# Patient Record
Sex: MALE | Race: OTHER | NOT HISPANIC OR LATINO
[De-identification: names, ages, dates, MRNs, and addresses within clinical notes are randomized per-mention and may not be internally consistent; named-entity substitution may affect disease eponyms.]

---

## 2018-02-07 PROBLEM — Z00.00 ENCOUNTER FOR PREVENTIVE HEALTH EXAMINATION: Status: ACTIVE | Noted: 2018-02-07

## 2018-03-26 ENCOUNTER — APPOINTMENT (OUTPATIENT)
Dept: CARDIOLOGY | Facility: CLINIC | Age: 68
End: 2018-03-26

## 2018-04-06 ENCOUNTER — APPOINTMENT (OUTPATIENT)
Dept: GASTROENTEROLOGY | Facility: CLINIC | Age: 68
End: 2018-04-06

## 2018-04-06 ENCOUNTER — OUTPATIENT (OUTPATIENT)
Dept: OUTPATIENT SERVICES | Facility: HOSPITAL | Age: 68
LOS: 1 days | Discharge: HOME | End: 2018-04-06

## 2018-04-06 VITALS
DIASTOLIC BLOOD PRESSURE: 77 MMHG | BODY MASS INDEX: 31.47 KG/M2 | WEIGHT: 200.5 LBS | HEIGHT: 67 IN | RESPIRATION RATE: 16 BRPM | SYSTOLIC BLOOD PRESSURE: 126 MMHG | HEART RATE: 62 BPM

## 2018-04-06 DIAGNOSIS — Z12.11 ENCOUNTER FOR SCREENING FOR MALIGNANT NEOPLASM OF COLON: ICD-10-CM

## 2018-04-06 DIAGNOSIS — Z95.1 PRESENCE OF AORTOCORONARY BYPASS GRAFT: ICD-10-CM

## 2018-04-06 DIAGNOSIS — Z78.9 OTHER SPECIFIED HEALTH STATUS: ICD-10-CM

## 2018-04-06 RX ORDER — CIPROFLOXACIN HYDROCHLORIDE 500 MG/1
500 TABLET, FILM COATED ORAL
Qty: 20 | Refills: 0 | Status: ACTIVE | COMMUNITY
Start: 2018-04-06 | End: 1900-01-01

## 2018-04-06 RX ORDER — METRONIDAZOLE 500 MG/1
500 TABLET ORAL 3 TIMES DAILY
Qty: 30 | Refills: 0 | Status: ACTIVE | COMMUNITY
Start: 2018-04-06 | End: 1900-01-01

## 2018-04-19 ENCOUNTER — APPOINTMENT (OUTPATIENT)
Dept: SURGERY | Facility: CLINIC | Age: 68
End: 2018-04-19
Payer: MEDICARE

## 2018-04-19 VITALS
BODY MASS INDEX: 31.55 KG/M2 | HEIGHT: 67 IN | WEIGHT: 201 LBS | DIASTOLIC BLOOD PRESSURE: 76 MMHG | SYSTOLIC BLOOD PRESSURE: 118 MMHG

## 2018-04-19 DIAGNOSIS — Z86.79 PERSONAL HISTORY OF OTHER DISEASES OF THE CIRCULATORY SYSTEM: ICD-10-CM

## 2018-04-19 DIAGNOSIS — K61.1 RECTAL ABSCESS: ICD-10-CM

## 2018-04-19 DIAGNOSIS — M25.471 EFFUSION, RIGHT ANKLE: ICD-10-CM

## 2018-04-19 DIAGNOSIS — M25.472 EFFUSION, RIGHT ANKLE: ICD-10-CM

## 2018-04-19 DIAGNOSIS — Z87.39 PERSONAL HISTORY OF OTHER DISEASES OF THE MUSCULOSKELETAL SYSTEM AND CONNECTIVE TISSUE: ICD-10-CM

## 2018-04-19 DIAGNOSIS — Z82.49 FAMILY HISTORY OF ISCHEMIC HEART DISEASE AND OTHER DISEASES OF THE CIRCULATORY SYSTEM: ICD-10-CM

## 2018-04-19 DIAGNOSIS — K60.3 ANAL FISTULA: ICD-10-CM

## 2018-04-19 PROCEDURE — 46600 DIAGNOSTIC ANOSCOPY SPX: CPT

## 2018-04-19 PROCEDURE — 99203 OFFICE O/P NEW LOW 30 MIN: CPT | Mod: 25

## 2018-04-19 RX ORDER — HYDROCORTISONE 2.5% 25 MG/G
2.5 CREAM TOPICAL 3 TIMES DAILY
Qty: 60 | Refills: 0 | Status: ACTIVE | COMMUNITY
Start: 2018-04-19 | End: 1900-01-01

## 2018-04-23 PROBLEM — Z82.49 FAMILY HISTORY OF CARDIAC DISORDER: Status: ACTIVE | Noted: 2018-04-19

## 2018-05-31 ENCOUNTER — MESSAGE (OUTPATIENT)
Age: 68
End: 2018-05-31

## 2018-06-05 ENCOUNTER — APPOINTMENT (OUTPATIENT)
Dept: SURGERY | Facility: CLINIC | Age: 68
End: 2018-06-05

## 2018-06-25 ENCOUNTER — APPOINTMENT (OUTPATIENT)
Dept: CARDIOLOGY | Facility: CLINIC | Age: 68
End: 2018-06-25

## 2018-06-25 ENCOUNTER — OUTPATIENT (OUTPATIENT)
Dept: OUTPATIENT SERVICES | Facility: HOSPITAL | Age: 68
LOS: 1 days | Discharge: HOME | End: 2018-06-25

## 2018-06-25 VITALS
HEIGHT: 67 IN | WEIGHT: 200 LBS | SYSTOLIC BLOOD PRESSURE: 100 MMHG | HEART RATE: 63 BPM | BODY MASS INDEX: 31.39 KG/M2 | DIASTOLIC BLOOD PRESSURE: 64 MMHG

## 2018-06-25 DIAGNOSIS — I10 ESSENTIAL (PRIMARY) HYPERTENSION: ICD-10-CM

## 2018-06-25 DIAGNOSIS — I25.10 ATHEROSCLEROTIC HEART DISEASE OF NATIVE CORONARY ARTERY W/OUT ANGINA PECTORIS: ICD-10-CM

## 2018-06-25 DIAGNOSIS — E78.5 HYPERLIPIDEMIA, UNSPECIFIED: ICD-10-CM

## 2018-06-25 RX ORDER — PANTOPRAZOLE SODIUM 40 MG/1
40 TABLET, DELAYED RELEASE ORAL
Refills: 0 | Status: ACTIVE | COMMUNITY

## 2018-06-25 RX ORDER — CLOPIDOGREL 75 MG/1
75 TABLET, FILM COATED ORAL
Refills: 0 | Status: ACTIVE | COMMUNITY

## 2018-06-25 RX ORDER — LOSARTAN POTASSIUM 100 MG/1
100 TABLET, FILM COATED ORAL
Refills: 0 | Status: ACTIVE | COMMUNITY

## 2018-06-25 RX ORDER — SIMVASTATIN 40 MG/1
40 TABLET, FILM COATED ORAL
Refills: 0 | Status: ACTIVE | COMMUNITY

## 2018-06-25 RX ORDER — AMLODIPINE BESYLATE 5 MG/1
5 TABLET ORAL
Refills: 0 | Status: ACTIVE | COMMUNITY

## 2018-06-25 RX ORDER — TRAMADOL HYDROCHLORIDE 50 MG/1
50 TABLET, COATED ORAL
Refills: 0 | Status: ACTIVE | COMMUNITY

## 2018-06-25 RX ORDER — CARVEDILOL 25 MG/1
25 TABLET, FILM COATED ORAL
Refills: 0 | Status: ACTIVE | COMMUNITY

## 2018-07-04 PROBLEM — E78.5 DYSLIPIDEMIA: Status: ACTIVE | Noted: 2018-04-06

## 2018-07-11 ENCOUNTER — OTHER (OUTPATIENT)
Age: 68
End: 2018-07-11

## 2018-07-12 ENCOUNTER — OTHER (OUTPATIENT)
Age: 68
End: 2018-07-12

## 2018-07-16 ENCOUNTER — OTHER (OUTPATIENT)
Age: 68
End: 2018-07-16

## 2018-07-23 ENCOUNTER — APPOINTMENT (OUTPATIENT)
Dept: OTOLARYNGOLOGY | Facility: CLINIC | Age: 68
End: 2018-07-23

## 2018-07-24 ENCOUNTER — OUTPATIENT (OUTPATIENT)
Dept: OUTPATIENT SERVICES | Facility: HOSPITAL | Age: 68
LOS: 1 days | Discharge: HOME | End: 2018-07-24

## 2018-07-24 DIAGNOSIS — I25.10 ATHEROSCLEROTIC HEART DISEASE OF NATIVE CORONARY ARTERY WITHOUT ANGINA PECTORIS: ICD-10-CM

## 2018-09-10 ENCOUNTER — APPOINTMENT (OUTPATIENT)
Dept: CARDIOLOGY | Facility: CLINIC | Age: 68
End: 2018-09-10

## 2018-09-10 VITALS — SYSTOLIC BLOOD PRESSURE: 82 MMHG | DIASTOLIC BLOOD PRESSURE: 52 MMHG | HEART RATE: 96 BPM

## 2018-09-27 ENCOUNTER — APPOINTMENT (OUTPATIENT)
Dept: PODIATRY | Facility: CLINIC | Age: 68
End: 2018-09-27

## 2018-10-01 ENCOUNTER — APPOINTMENT (OUTPATIENT)
Dept: OTOLARYNGOLOGY | Facility: CLINIC | Age: 68
End: 2018-10-01

## 2018-10-15 ENCOUNTER — APPOINTMENT (OUTPATIENT)
Dept: CARDIOLOGY | Facility: CLINIC | Age: 68
End: 2018-10-15

## 2018-10-26 ENCOUNTER — APPOINTMENT (OUTPATIENT)
Dept: OTOLARYNGOLOGY | Facility: CLINIC | Age: 68
End: 2018-10-26

## 2018-10-29 ENCOUNTER — APPOINTMENT (OUTPATIENT)
Dept: PODIATRY | Facility: CLINIC | Age: 68
End: 2018-10-29

## 2022-12-07 ENCOUNTER — APPOINTMENT (OUTPATIENT)
Dept: ORTHOPEDIC SURGERY | Facility: CLINIC | Age: 72
End: 2022-12-07

## 2022-12-21 ENCOUNTER — APPOINTMENT (OUTPATIENT)
Dept: PAIN MANAGEMENT | Facility: CLINIC | Age: 72
End: 2022-12-21

## 2022-12-21 VITALS
HEIGHT: 67 IN | DIASTOLIC BLOOD PRESSURE: 72 MMHG | HEART RATE: 67 BPM | BODY MASS INDEX: 30.13 KG/M2 | SYSTOLIC BLOOD PRESSURE: 112 MMHG | WEIGHT: 192 LBS

## 2022-12-21 PROCEDURE — 99204 OFFICE O/P NEW MOD 45 MIN: CPT

## 2022-12-21 RX ORDER — GABAPENTIN 300 MG
300 TABLET ORAL
Refills: 0 | Status: ACTIVE | COMMUNITY

## 2022-12-21 RX ORDER — PANTOPRAZOLE 40 MG/1
40 TABLET, DELAYED RELEASE ORAL
Refills: 0 | Status: ACTIVE | COMMUNITY

## 2022-12-21 RX ORDER — FUROSEMIDE 20 MG/1
20 TABLET ORAL
Refills: 0 | Status: ACTIVE | COMMUNITY

## 2022-12-21 RX ORDER — ROSUVASTATIN CALCIUM 20 MG/1
20 TABLET, FILM COATED ORAL
Refills: 0 | Status: ACTIVE | COMMUNITY

## 2022-12-21 RX ORDER — CLOPIDOGREL BISULFATE 75 MG/1
75 TABLET, FILM COATED ORAL
Refills: 0 | Status: ACTIVE | COMMUNITY

## 2022-12-21 RX ORDER — ACETAMINOPHEN 500 MG
500 TABLET ORAL
Refills: 0 | Status: ACTIVE | COMMUNITY

## 2022-12-21 RX ORDER — LOSARTAN POTASSIUM 100 MG/1
100 TABLET, FILM COATED ORAL
Refills: 0 | Status: ACTIVE | COMMUNITY

## 2022-12-21 NOTE — DATA REVIEWED
[FreeTextEntry1] : SOAPP: low on 12/21/22\par Low risk: Patient has combination of a low risk SOAP and no risk factors. UDS would be repeated randomly every quarter.\par \par UDS: No data obtained today\par \par [place] REGISTRY: Checked\par

## 2022-12-21 NOTE — ASSESSMENT
[FreeTextEntry1] : This is 72 year old male presenting with lower back pain with radicular symptoms into the legs bilaterally. He is aware that we need to review the prior imaging before we can move forward with injections. He will continue to take the Gabapentin and will call  to make a follow up appointment once he gets his imaging results. All this patients questions were answered and the conversation was understood well.\par \par \par I, Natasha Chambers, attest that this documentation has been prepared under the direction and in the presence of Provider Mary Ann Harry MD.\par \par \par Thank you for allowing me to assist in the management of this patient. \par \par \par Best Regards, \par \par \par Mary Ann Harry M.D., [facility]\par \par \par Diplomate, American Board of Physical Medicine and Rehabilitation\par Diplomate, American Board of Pain Medicine \par Diplomate, American Board of Pain Management\par

## 2022-12-21 NOTE — HISTORY OF PRESENT ILLNESS
[FreeTextEntry1] : This is a 72 year old male presenting with his wife. His chief complaint is lower back pain that travels into both lower extremities. He went to [facility] a few times due to the severity of the pain. Sciatic pain noted in both lower extremities. He states that he underwent an MRI of the lumbar spine at [facility] which we do not have access to review. He has difficulty standing for long periods of time. He previously trialed PT in the past but states the it made his pain worse. He states his legs feel heavy and "grab" when he changes from a sit to stand position.  He states that he will get results of imaging from his PCP. He states his legs feel heavy and "grab" when he changes from a sit to stand position. \par \par Of note he has secondary B knee pain for which he underwent previous injections with an orthopedist that provided him relief. \par \par He has been taking Gabapentin 300 mg BID which is providing him with partial relief. He is aware that he can take the Gabapentin up to 3 times a day. \par \par The patient has had this pain for 8 months. The pain started after her no event.  Patient describes pain as moderate to severe and intermittently. During the last month the pain has been worse during the morning, afternoon, evening, night. Pain described as sharp. Pain is increased with standing. Pain is decreased with lying down. Pain is not changed with standing, walking. Bowel or bladder habits have not changed.\par \par ACTIVITIES: Patient could walk less than 1 block before the pain starts. Patient can stand 5-10 minutes before pain starts. The patient time lays down because of pain. Patient uses walker at this time. Patient has difficulty going to work, doing yd work or shopping, socializing with friends, participating in recreational activities, exercising at this time.\par \par PRIOR PAIN TREATMENTS: No relief with exercise, heat treatment.\par \par PRIOR PAIN MEDICATIONS:  Tramadol, Tylenol, Motrin.

## 2022-12-21 NOTE — PHYSICAL EXAM
[Normal Coordination] : normal coordination [Normal DTR UE/LE] : normal DTR UE/LE  [Normal Sensation] : normal sensation [Normal Mood and Affect] : normal mood and affect [Orientated] : orientated [Able to Communicate] : able to communicate [Well Developed] : well developed [Well Nourished] : well nourished [4___] : right plantor flexors 4[unfilled]/5 [Diminished] : patella reflex diminished [] : no swelling

## 2023-01-26 ENCOUNTER — APPOINTMENT (OUTPATIENT)
Dept: PAIN MANAGEMENT | Facility: CLINIC | Age: 73
End: 2023-01-26
Payer: MEDICARE

## 2023-01-26 VITALS
BODY MASS INDEX: 30.13 KG/M2 | WEIGHT: 192 LBS | HEIGHT: 67 IN | HEART RATE: 69 BPM | SYSTOLIC BLOOD PRESSURE: 120 MMHG | DIASTOLIC BLOOD PRESSURE: 80 MMHG

## 2023-01-26 PROCEDURE — 62323 NJX INTERLAMINAR LMBR/SAC: CPT

## 2023-01-26 PROCEDURE — 94761 N-INVAS EAR/PLS OXIMETRY MLT: CPT

## 2023-01-26 PROCEDURE — 93770 DETERMINATION VENOUS PRESS: CPT

## 2023-01-26 PROCEDURE — 99213 OFFICE O/P EST LOW 20 MIN: CPT | Mod: 25

## 2023-01-26 NOTE — ASSESSMENT
[FreeTextEntry1] : This is 72 year old male presenting with lower back pain with radicular symptoms into the legs bilaterally. The MRI of the lumbar spine was reviewed in detail today and finds arthritis multiple bulging discs. He experiences neurologic claudication secondary to spinal stenosis, therefore, we will submit for a caudal FEDERICA x1 w/ mac which he will undergo later today. He will follow up afterwards and we can discuss moving forward with knee injections at his next visit.  All this patients questions were answered and the conversation was understood well.\par \par Patient had a MRI that shows a radicular component along with pain referred into the lower extremity. Patient has trialed rehab (Home exercise, physical therapy or chiropractic care) and medications I will schedule a Caudal 1-3 depending on effectiveness.\par \par ANESTHESIA PARAGRAPH: The patient has severe anxiety of procedures that necessitates monitored anesthesia care (MAC). The procedure performed will be close to major nerves, arteries, and spinal cord and/or joint structures. Due to the proximity of these structures, we need the patient to be still during the procedure. With the help of MAC, this will be safely achieved and decrease the risk of any complications.\par \par RISK AND BENEFIT PARAGRAPH: Risk, benefits, pros and cons of procedure were explained to the patient using models and diagrams and their questions were answered.\par \par I, Natasha Chambers, attest that this documentation has been prepared under the direction and in the presence of Provider Mary Ann Harry MD.\par \par \par Thank you for allowing me to assist in the management of this patient. \par \par \par Best Regards, \par \par \par Mary Ann Harry M.D., [facility]\par \par \par Diplomate, American Board of Physical Medicine and Rehabilitation\par Diplomate, American Board of Pain Medicine \par Diplomate, American Board of Pain Management\par

## 2023-01-26 NOTE — DATA REVIEWED
[FreeTextEntry1] : MRI of the lumbar spine dated 12/13/22 finds no fracture or bony Destructive changes.  Grade 1 anterior listhesis of L4 over L5.  Severe central canal and lateral recess stenoses  at L4-5.  Moderate central canal stenoses  at L2-3 and L3-4.  Multilevel neural foraminal stenosis, worst ( moderate to severe)  in the right L2-3, right L4-5 and right L5-S1 levels.\par \par X-ray of the bilateral hips dated  11/28/2022 finds moderate to severe degenerative changes of the bilateral knees most prominent in the medial tibial femoral compartments.\par \par SOAPP: low on 12/21/22\par Low risk: Patient has combination of a low risk SOAP and no risk factors. UDS would be repeated randomly every quarter.\par \par UDS: No data obtained today.\par \par [place] REGISTRY: Checked.\par

## 2023-01-26 NOTE — PROCEDURE
[FreeTextEntry3] : \par CAUDAL EPIDURAL STEROID INJECTION\par \par  \par \par Date:  2023\par \par Patient: Carlos Tabares\par \par :  1950\par \par \par Preoperative Diagnosis: Lumbar Radiculopathy\par \par Postoperative Diagnosis: Lumbar Radiculopathy\par \par \par \par Procedure: Caudal epidural injection under fluoroscopic guidance\par \par \par Physician: Mary Ann Harry MD, FAAPMR\par \par \par Anesthesia: Local. See Nurses notes\par \par \par \par Medical Necessity:  Failure of conservative management.\par \par \par \par Indication for Fluoroscopy:  This procedure requires the precise placement of the spinal needle into the epidural space.  It is the only way to accurately and safely perform the injection.\par \par \par \par CONSENT: The possible complications including infection, bleeding, nerve damage, hospital admission or failure of the procedure; though unusual, are theoretically possible. The patient was educated about the of the procedure and alternative therapies. All questions were answered and the patient freely gave consent to proceed.\par \par \par \par Monitoring:  Patient had continuous blood pressure, EKG, and pulse oximetry throughout the case. See nurse's notes.\par \par  \par \par PROCEDURE NOTE:\par \par After obtaining written consent, the patient was placed in the prone position with a pillow under the pelvis. Multiple fluoroscopic views of the sacrum-AP & Lateral- were obtained. The lower back and upper gluteal region were prepped with betadine and draped in the sterile fashion. A time out was performed.  The skin over the sacral hiatus was infiltrated with local anesthetic and a 18 gauge 3 ½ inch needle was inserted. The angle of the needle was lowered until it was felt to penetrate the sacrococcygeal ligament at which time the needle was advanced without resistance. Fluoroscopy confirmed the position of the needle within the caudal space. Omnipaque 240, 3 cc was injected to confirm an appropriate epidural spread. A total of 9ml of preservative-free sterile saline, 1 ml of depomedrol (80mg/cc) was injected via the needle into the caudal space.  The needle was cleared with three ml preservative-free normal saline.  There was no evidence of CSF or heme. The needle was removed intact. A band aid was place on the site.\par \par  \par \par Epiduragram Report: A spinal needle is in place in the caudal epidural space.  Central epidural spread of dye is noted from the lower sacral segments to L4-5.    Dye is seen outlining the sacral nerve roots bilaterally as they emerge from their respective foramen without obstruction pointing away from adhesion/fibrosis\par \par  \par \par Complications: None. The patient tolerated the procedure well. \par \par  \par \par Disposition: I have examined the patient and there are no new physical findings since the original presentation.  Sensory and motor function were intact. The patient met discharge criteria see nurses notes. The discharge instruction sheet was reviewed and given to the patient. The patient was discharged home with a . If patient gets sustained relief will have patient do modified planks 3 times a day on carpet or yoga mat starting at 5 seconds building up to 1 minute without grimacing/Valsalva and walking.\par \par  \par \par Comments: 1st caudal FEDERICA today, depending on effectiveness would schedule a 2nd caudal FEDERICA in 1-2 weeks or follow up in office depending on insurance. Call if any problems.\par \par \par \par \par This document was electronically signed by: \par \par \par Mary Ann Harry MD, FAAPMR\par \par Diplomate, American Board of Physical Medicine and Rehabilitation\par \par Diplomate, American Board of Pain Medicine\par \par \par \par

## 2023-01-26 NOTE — HISTORY OF PRESENT ILLNESS
[FreeTextEntry1] : ORIGINAL PRESENTATION: This is a 72 year old male presenting with his wife. His chief complaint is lower back pain that travels into both lower extremities. He went to [facility] a few times due to the severity of the pain. Sciatic pain noted in both lower extremities. He states that he underwent an MRI of the lumbar spine at [facility] which we do not have access to review. He has difficulty standing for long periods of time. He previously trialed PT in the past but states the it made his pain worse. He states his legs feel heavy and "grab" when he changes from a sit to stand position.  He states that he will get results of imaging from his PCP. He states his legs feel heavy and "grab" when he changes from a sit to stand position. \par \par Of note he has secondary B knee pain for which he underwent previous injections with an orthopedist that provided him relief. \par \par He has been taking Gabapentin 300 mg BID which is providing him with partial relief. He is aware that he can take the Gabapentin up to 3 times a day. \par \par The patient has had this pain for 8 months. The pain started after her no event.  Patient describes pain as moderate to severe and intermittently. During the last month the pain has been worse during the morning, afternoon, evening, night. Pain described as sharp. Pain is increased with standing. Pain is decreased with lying down. Pain is not changed with standing, walking. Bowel or bladder habits have not changed.\par \par ACTIVITIES: Patient could walk less than 1 block before the pain starts. Patient can stand 5-10 minutes before pain starts. The patient time lays down because of pain. Patient uses walker at this time. Patient has difficulty going to work, doing yd work or shopping, socializing with friends, participating in recreational activities, exercising at this time.\par \par PRIOR PAIN TREATMENTS: No relief with exercise, heat treatment.\par \par PRIOR PAIN MEDICATIONS:  Tramadol, Tylenol, Motrin.\par \par PATIENT PRESENTS FOR FOLLOW UP: He is here for a revisit with a chief complaint of lower back pain that travels into both lower extremities. He is experiencing secondary bilateral knee pain. He underwent a MRI of the lumbar spine at [facility] which finds spinal stenosis and bulging discs which could be the cause of his neurogenic claudication symptoms. The X-ray of the bilateral knees finds severe degenerative changes. He has trialed PT in the past which provided him no relief. The option to move forward with a caudal FEDERICA x1 w/ mac was discussed in detail and he is interested in moving forward.\par

## 2023-02-16 ENCOUNTER — APPOINTMENT (OUTPATIENT)
Dept: PAIN MANAGEMENT | Facility: CLINIC | Age: 73
End: 2023-02-16
Payer: MEDICARE

## 2023-02-16 VITALS
BODY MASS INDEX: 30.13 KG/M2 | SYSTOLIC BLOOD PRESSURE: 158 MMHG | WEIGHT: 192 LBS | HEIGHT: 67 IN | HEART RATE: 69 BPM | DIASTOLIC BLOOD PRESSURE: 120 MMHG

## 2023-02-16 DIAGNOSIS — M54.50 LOW BACK PAIN, UNSPECIFIED: ICD-10-CM

## 2023-02-16 PROCEDURE — 99214 OFFICE O/P EST MOD 30 MIN: CPT

## 2023-02-16 NOTE — PHYSICAL EXAM
[Normal Coordination] : normal coordination [Normal DTR UE/LE] : normal DTR UE/LE  [Normal Sensation] : normal sensation [Normal Mood and Affect] : normal mood and affect [Orientated] : orientated [Able to Communicate] : able to communicate [Well Developed] : well developed [Well Nourished] : well nourished [4___] : right plantor flexors 4[unfilled]/5 [Diminished] : achilles reflex diminished [] : SI joint tenderness [de-identified] : Ben testing is positive for reproduction of pain at the PSIS, and there is a positive Kamar Finger test and a positive Gaenslen's test on the right.\par

## 2023-02-16 NOTE — ASSESSMENT
[FreeTextEntry1] : This is 72 year old male presenting with lower back pain with radicular symptoms into the legs bilaterally. He is s/p  a caudal FEDERICA x1 01/26/23 with resolution of his left sided lower back pain. He presents today mainly with right sided pain. He has clinical indications of possible right sacroiliitis. We are going to send out for a diagnostic right SI joint injection to rule out right sacroiliitis. This will be done without mac. If successful, RFA can be considered. He will follow up afterwards and we can discuss moving forward with knee injections at his next visit.  All this patients questions were answered and the conversation was understood well. \par \par Patient had pain on PSIS area, Casa’s positive and pelvic rocking positive. Patient has trialed rehab (Home exercise, physical therapy or chiropractic care) and medications. Schedule a right diagnostic and therapeutic sacroiliac joint injection. If greater than 50% relief for greater than 30 minutes, would do a second right sacroiliac joint injection in 1-2 weeks. With greater than 50% relief for 6-8 weeks, a right sacroiliac joint injection could be repeated in 3 months vs RFA or referral to neurosurgeon.\par \par RISK AND BENEFIT PARAGRAPH: Risk, benefits, pros and cons of procedure were explained to the patient using models and diagrams and their questions were answered.\par \par JYOTSNA, Sofia Wagner, attest that this documentation has been prepared under the direction and in the presence of Provider Mary Ann Harry MD.\par \par \par Thank you for allowing me to assist in the management of this patient. \par \par \par Best Regards, \par \par \par Mary Ann Harry M.D., [facility]\par \par \par Diplomate, American Board of Physical Medicine and Rehabilitation\par Diplomate, American Board of Pain Medicine \par Diplomate, American Board of Pain Management\par

## 2023-02-16 NOTE — HISTORY OF PRESENT ILLNESS
[FreeTextEntry1] : ORIGINAL PRESENTATION: This is a 72 year old male presenting with his wife. His chief complaint is lower back pain that travels into both lower extremities. He went to [facility] a few times due to the severity of the pain. Sciatic pain noted in both lower extremities. He states that he underwent an MRI of the lumbar spine at [facility] which we do not have access to review. He has difficulty standing for long periods of time. He previously trialed PT in the past but states the it made his pain worse. He states his legs feel heavy and "grab" when he changes from a sit to stand position.  He states that he will get results of imaging from his PCP. He states his legs feel heavy and "grab" when he changes from a sit to stand position. \par \par Of note he has secondary B knee pain for which he underwent previous injections with an orthopedist that provided him relief. \par \par He has been taking Gabapentin 300 mg BID which is providing him with partial relief. He is aware that he can take the Gabapentin up to 3 times a day. \par \par The patient has had this pain for 8 months. The pain started after her no event.  Patient describes pain as moderate to severe and intermittently. During the last month the pain has been worse during the morning, afternoon, evening, night. Pain described as sharp. Pain is increased with standing. Pain is decreased with lying down. Pain is not changed with standing, walking. Bowel or bladder habits have not changed.\par \par ACTIVITIES: Patient could walk less than 1 block before the pain starts. Patient can stand 5-10 minutes before pain starts. The patient time lays down because of pain. Patient uses walker at this time. Patient has difficulty going to work, doing yd work or shopping, socializing with friends, participating in recreational activities, exercising at this time.\par \par PRIOR PAIN TREATMENTS: No relief with exercise, heat treatment.\par \par PRIOR PAIN MEDICATIONS:  Tramadol, Tylenol, Motrin.\par \par PATIENT PRESENTS FOR FOLLOW UP: He is here for a revisit with a chief complaint of lower back pain that travels into the lower extremities. He is experiencing secondary bilateral knee pain. He underwent a MRI of the lumbar spine at [facility] which finds spinal stenosis and bulging discs which could be the cause of his neurogenic claudication symptoms. The X-ray of the bilateral knees finds severe degenerative changes. He has trialed PT in the past which provided him no relief. He recently underwent a caudal FEDERICA x1 01/26/23. He notes he had good relief of his left sided lower back pain. He is no longer having any left sided pain. He complains mainly now of right sided lower back pain into the right lower extremity. Pain worsens when sitting. Localized to the right lower back/buttocks region. \par

## 2023-03-09 ENCOUNTER — APPOINTMENT (OUTPATIENT)
Dept: PAIN MANAGEMENT | Facility: CLINIC | Age: 73
End: 2023-03-09
Payer: MEDICARE

## 2023-03-09 DIAGNOSIS — M47.817 SPONDYLOSIS W/OUT MYELOPATHY OR RADICULOPATHY, LUMBOSACRAL REGION: ICD-10-CM

## 2023-03-09 DIAGNOSIS — M47.818 SPONDYLOSIS W/OUT MYELOPATHY OR RADICULOPATHY, SACRAL AND SACROCOCCYGEAL REGION: ICD-10-CM

## 2023-03-09 PROCEDURE — 94761 N-INVAS EAR/PLS OXIMETRY MLT: CPT

## 2023-03-09 PROCEDURE — 93770 DETERMINATION VENOUS PRESS: CPT | Mod: 59

## 2023-03-09 PROCEDURE — 93040 RHYTHM ECG WITH REPORT: CPT | Mod: 79

## 2023-03-09 PROCEDURE — 27096 INJECT SACROILIAC JOINT: CPT | Mod: RT

## 2023-03-09 NOTE — PROCEDURE
[FreeTextEntry1] : SACROILIAC JOINT INJECTION UNDER FLUOROSCOPY [FreeTextEntry3] : \par Date:  2023\par \par Patient: Carlos Tabares\par \par :  1950\par \par \par \par \par \par Preoperative Diagnosis: Right  SIJ Arthropathic\par \par Procedure: Right  SIJ Injection under Fluoroscopy\par \par Physician: Mary Ann Harry MD, FAAPMR\par \par Anesthesia: See nurses note//Cold spray. Local \par \par \par Medical Necessity:  Failure of conservative management.\par \par \par Indications:  The patient was admitted for a median branch injection for diagnostic purposes.  The patient was explained the technique, complications and rationale for the procedure and elected to proceed.\par \par \par \par \par Indication for Fluoroscopy:  This procedure requires the precise placement of the spinal needle.  It is the only way to accurately and safely perform the injection.\par \par \par \par \par CONSENT: The possible complications including infection, bleeding, nerve damage, Hospital admission, death or failure of the procedure; though unusual, are theoretically possible. The patient was educated about the of the procedure and alternative therapies. All questions were answered and the patient freely gave consent to proceed.\par \par \par \par \par Monitoring:  Patient had continuous blood pressure, EKG, and pulse oximetry throughout the case. See nurse's notes\par \par  \par \par PROCEDURE NOTE:\par \par After obtaining written consent, the patient was placed on the fluoroscopic table in the prone position  A time out was performed.  Fluoroscopic guidance was used to isolate and identify the Right sacroiliac joint.  A medial to lateral oblique projection allowed separation of the anterior (lateral) and posterior (medial) joint space.  The sacrum and posterior iliac crest was prepped with Betadine and draped in usual sterile fashion. Cold spray was used to localize the area. A 22-gauge 3.5 inch spinal needle was directed into the inferior aspect of the sacroiliac joint using a posterior approach in bull's eye fashion. The interosseous ligament was engaged which provoked responses consisting of her typical painful symptoms. A 2 cc total volume of lidocaine 2% ( 1 ½ cc) and depomedrol 40mg (1/2 cc) was injected. Volumes were kept small-commensurate with the joint's capacity as determined by end-injection back pressure on the syringe. The needle was removed.\par \par  \par \par Sacro-iliac Joint Radiography:\par \par Omnipaque 240mg/cc - 1/2 cc was injected in the inferior pole of the SIJ and the entire sacroiliac joint was outlined under fluoroscopy.\par \par \par Complications: None. The patient tolerated the procedure well.\par \par  \par \par Disposition: I have examined the patient and there are no new physical findings since the original presentation.  Sensory and motor function were intact. The patient met discharge criteria see nurses notes. The discharge instruction sheet was reviewed and given to the patient. The patient was discharged home with a . If patient gets sustained relief will have patient do modified planks 3 times a day on carpet or yoga mat starting at 5 seconds building up to 1 minute without grimacing/Valsalva and walking.\par \par If patient gets sustained relief will have patient do modified planks 3 times a day on carpet or yoga mat starting at 5 seconds building up to 1 minute without grimacing/Valsalva and walking.\par \par  \par \par Comment: If 70% relief greater than 2 hours or 50% greater than 24 hours would proceed to RFA. If 50% less than 24 hours would repeat to confirm for RFA. Follow in office. Call if any problems.\par \par \par \par \par  \par \par This document was electronically signed by: \par \par \par Mary Ann Harry MD, FAAPMR\par Diplomate, American Board of Physical Medicine and Rehabilitation\par Diplomate, American Board of Pain Medicine\par \par \par \par

## 2023-03-30 ENCOUNTER — APPOINTMENT (OUTPATIENT)
Dept: PAIN MANAGEMENT | Facility: CLINIC | Age: 73
End: 2023-03-30

## 2023-04-17 ENCOUNTER — INPATIENT (INPATIENT)
Facility: HOSPITAL | Age: 73
LOS: 0 days | Discharge: LEFT BEFORE DISCHARGE | DRG: 556 | End: 2023-04-18
Attending: HOSPITALIST | Admitting: HOSPITALIST
Payer: MEDICARE

## 2023-04-17 VITALS
OXYGEN SATURATION: 98 % | SYSTOLIC BLOOD PRESSURE: 189 MMHG | TEMPERATURE: 98 F | RESPIRATION RATE: 18 BRPM | HEART RATE: 70 BPM | DIASTOLIC BLOOD PRESSURE: 87 MMHG

## 2023-04-17 DIAGNOSIS — R26.2 DIFFICULTY IN WALKING, NOT ELSEWHERE CLASSIFIED: ICD-10-CM

## 2023-04-17 LAB
ALBUMIN SERPL ELPH-MCNC: 4.1 G/DL — SIGNIFICANT CHANGE UP (ref 3.5–5.2)
ALP SERPL-CCNC: 55 U/L — SIGNIFICANT CHANGE UP (ref 30–115)
ALT FLD-CCNC: 9 U/L — SIGNIFICANT CHANGE UP (ref 0–41)
ANION GAP SERPL CALC-SCNC: 9 MMOL/L — SIGNIFICANT CHANGE UP (ref 7–14)
AST SERPL-CCNC: 10 U/L — SIGNIFICANT CHANGE UP (ref 0–41)
BASOPHILS # BLD AUTO: 0.05 K/UL — SIGNIFICANT CHANGE UP (ref 0–0.2)
BASOPHILS NFR BLD AUTO: 0.9 % — SIGNIFICANT CHANGE UP (ref 0–1)
BILIRUB SERPL-MCNC: 0.9 MG/DL — SIGNIFICANT CHANGE UP (ref 0.2–1.2)
BUN SERPL-MCNC: 10 MG/DL — SIGNIFICANT CHANGE UP (ref 10–20)
CALCIUM SERPL-MCNC: 9.8 MG/DL — SIGNIFICANT CHANGE UP (ref 8.4–10.5)
CHLORIDE SERPL-SCNC: 106 MMOL/L — SIGNIFICANT CHANGE UP (ref 98–110)
CO2 SERPL-SCNC: 28 MMOL/L — SIGNIFICANT CHANGE UP (ref 17–32)
CREAT SERPL-MCNC: 0.8 MG/DL — SIGNIFICANT CHANGE UP (ref 0.7–1.5)
EGFR: 94 ML/MIN/1.73M2 — SIGNIFICANT CHANGE UP
EOSINOPHIL # BLD AUTO: 0.1 K/UL — SIGNIFICANT CHANGE UP (ref 0–0.7)
EOSINOPHIL NFR BLD AUTO: 1.8 % — SIGNIFICANT CHANGE UP (ref 0–8)
GLUCOSE SERPL-MCNC: 119 MG/DL — HIGH (ref 70–99)
HCT VFR BLD CALC: 42.5 % — SIGNIFICANT CHANGE UP (ref 42–52)
HGB BLD-MCNC: 14.5 G/DL — SIGNIFICANT CHANGE UP (ref 14–18)
IMM GRANULOCYTES NFR BLD AUTO: 0.4 % — HIGH (ref 0.1–0.3)
LYMPHOCYTES # BLD AUTO: 1.62 K/UL — SIGNIFICANT CHANGE UP (ref 1.2–3.4)
LYMPHOCYTES # BLD AUTO: 29.1 % — SIGNIFICANT CHANGE UP (ref 20.5–51.1)
MCHC RBC-ENTMCNC: 27.9 PG — SIGNIFICANT CHANGE UP (ref 27–31)
MCHC RBC-ENTMCNC: 34.1 G/DL — SIGNIFICANT CHANGE UP (ref 32–37)
MCV RBC AUTO: 81.9 FL — SIGNIFICANT CHANGE UP (ref 80–94)
MONOCYTES # BLD AUTO: 0.55 K/UL — SIGNIFICANT CHANGE UP (ref 0.1–0.6)
MONOCYTES NFR BLD AUTO: 9.9 % — HIGH (ref 1.7–9.3)
NEUTROPHILS # BLD AUTO: 3.22 K/UL — SIGNIFICANT CHANGE UP (ref 1.4–6.5)
NEUTROPHILS NFR BLD AUTO: 57.9 % — SIGNIFICANT CHANGE UP (ref 42.2–75.2)
NRBC # BLD: 0 /100 WBCS — SIGNIFICANT CHANGE UP (ref 0–0)
NT-PROBNP SERPL-SCNC: 208 PG/ML — SIGNIFICANT CHANGE UP (ref 0–300)
PLATELET # BLD AUTO: 211 K/UL — SIGNIFICANT CHANGE UP (ref 130–400)
PMV BLD: 10 FL — SIGNIFICANT CHANGE UP (ref 7.4–10.4)
POTASSIUM SERPL-MCNC: 4.1 MMOL/L — SIGNIFICANT CHANGE UP (ref 3.5–5)
POTASSIUM SERPL-SCNC: 4.1 MMOL/L — SIGNIFICANT CHANGE UP (ref 3.5–5)
PROT SERPL-MCNC: 6.4 G/DL — SIGNIFICANT CHANGE UP (ref 6–8)
RBC # BLD: 5.19 M/UL — SIGNIFICANT CHANGE UP (ref 4.7–6.1)
RBC # FLD: 16.3 % — HIGH (ref 11.5–14.5)
SARS-COV-2 RNA SPEC QL NAA+PROBE: SIGNIFICANT CHANGE UP
SODIUM SERPL-SCNC: 143 MMOL/L — SIGNIFICANT CHANGE UP (ref 135–146)
TROPONIN T SERPL-MCNC: <0.01 NG/ML — SIGNIFICANT CHANGE UP
WBC # BLD: 5.56 K/UL — SIGNIFICANT CHANGE UP (ref 4.8–10.8)
WBC # FLD AUTO: 5.56 K/UL — SIGNIFICANT CHANGE UP (ref 4.8–10.8)

## 2023-04-17 PROCEDURE — 85025 COMPLETE CBC W/AUTO DIFF WBC: CPT

## 2023-04-17 PROCEDURE — 86803 HEPATITIS C AB TEST: CPT

## 2023-04-17 PROCEDURE — 82746 ASSAY OF FOLIC ACID SERUM: CPT

## 2023-04-17 PROCEDURE — 84443 ASSAY THYROID STIM HORMONE: CPT

## 2023-04-17 PROCEDURE — 93010 ELECTROCARDIOGRAM REPORT: CPT

## 2023-04-17 PROCEDURE — 99285 EMERGENCY DEPT VISIT HI MDM: CPT

## 2023-04-17 PROCEDURE — 93010 ELECTROCARDIOGRAM REPORT: CPT | Mod: 77

## 2023-04-17 PROCEDURE — 71045 X-RAY EXAM CHEST 1 VIEW: CPT | Mod: 26

## 2023-04-17 PROCEDURE — 80053 COMPREHEN METABOLIC PANEL: CPT

## 2023-04-17 PROCEDURE — 84100 ASSAY OF PHOSPHORUS: CPT

## 2023-04-17 PROCEDURE — 82607 VITAMIN B-12: CPT

## 2023-04-17 PROCEDURE — 93970 EXTREMITY STUDY: CPT | Mod: 26

## 2023-04-17 PROCEDURE — 97162 PT EVAL MOD COMPLEX 30 MIN: CPT | Mod: GP

## 2023-04-17 PROCEDURE — G0378: CPT

## 2023-04-17 NOTE — ED PROVIDER NOTE - NSICDXPASTSURGICALHX_GEN_ALL_CORE_FT
Update History & Physical    The Patient's History and Physical of 3/29/22 was reviewed with the patient and I examined the patient. There was no change. The surgical site was confirmed by the patient and me. Plan:  The risk, benefits, expected outcome, and alternative to the recommended procedure have been discussed with the patient. Patient understands and wants to proceed with the procedure.     Electronically signed by Jessica Jason MD on 3/29/2022 at 12:39 PM PAST SURGICAL HISTORY:  S/P CABG (coronary artery bypass graft)

## 2023-04-17 NOTE — ED PROVIDER NOTE - ATTENDING APP SHARED VISIT CONTRIBUTION OF CARE
72-year-old male with past medical history of HTN, HLD, CAD, L/S spinal disease (with most severe stenosis at L4-L5), OA, GERD, DM, presents to the ER for bilateral lower extremity swelling with a sensation of weakness to the legs and generalized body.  Patient states his legs feel so heavy to him that he is having now trouble walking even with a walker.  Denies any incontinence or saddle anesthesia.  Denies any headache/neck pain/chest pain/shortness of breath/fever/cough/rash.  States that he is having trouble mobilizing around the house because of the heavy sensation/weakness to the legs.  Was admitted to [facility] 4/1/2023 where he said he had a head CT which was negative but was not told what was wrong with his legs.  Last lumbar spinal MRI was done in December 2022 (patient has copy of this report).  Chronic knee pain from the osteoarthritis no acute findings on recent x-ray of the knee.    Agree with PA exam and management.  Will check labs, XR EKG and reassess. With the Pedal edema will check for acute chf exacerbation as well    ALL:nkda  Meds: asa, docusate, coreg, losartan, protonix, randazine, rosuvastatin, gabapentin 300 tid, furosemide 20 qd,   SH no smoking or etoh 72-year-old male with past medical history of HTN, HLD, CAD, L/S spinal disease (with most severe stenosis at L4-L5), OA, GERD, DM, presents to the ER for bilateral lower extremity swelling with a sensation of weakness to the legs and generalized body.  Patient states his legs feel so heavy to him that he is having now trouble walking even with a walker.  Denies any incontinence or saddle anesthesia.  Denies any headache/neck pain/chest pain/shortness of breath/fever/cough/rash.  States that he is having trouble mobilizing around the house because of the heavy sensation/weakness to the legs.  Was admitted to [facility] 4/1/2023 where he said he had a head CT which was negative but was not told what was wrong with his legs.  Last lumbar spinal MRI was done in December 2022 (patient has copy of this report---has lumbar spine dz with severe stenosis at L4-L5).  Chronic knee pain from the osteoarthritis no acute findings on recent x-ray of the knee.    Agree with PA exam and management.  Will check labs, XR EKG and reassess. With the Pedal edema will check for acute chf exacerbation as well    ALL:nkda  Meds: asa, docusate, coreg, losartan, protonix, randazine, rosuvastatin, gabapentin 300 tid, furosemide 20 qd,   SH no smoking or etoh

## 2023-04-17 NOTE — ED PROVIDER NOTE - PHYSICAL EXAMINATION
CONSTITUTIONAL: Well-appearing; well-nourished; in no apparent distress.   EYES: PERRL; EOM intact.   CARDIOVASCULAR: Normal S1, S2; no murmurs, rubs, or gallops.   RESPIRATORY: Normal chest excursion with respiration; breath sounds clear and equal bilaterally; no wheezes, rhonchi, or rales.  GI/: Normal bowel sounds; non-distended; non-tender; no palpable organomegaly.   MS: No midline tenderness to neck and back.  Sensation and strength equal to bilateral lower extremities.  Normal Babinski to bilateral feet.  2+ DP pulse to bilateral feet.  SKIN: No skin changes to the flank.  NEURO/PSYCH: A & O x 4; grossly unremarkable. Speaking coherently and moving all extremities.

## 2023-04-17 NOTE — ED PROVIDER NOTE - CLINICAL SUMMARY MEDICAL DECISION MAKING FREE TEXT BOX
72-year-old male with past medical history of HTN, HLD, CAD, L/S spinal disease (with most severe stenosis at L4-L5), OA, GERD, DM, presents to the ER for bilateral lower extremity swelling with a sensation of weakness to the legs and generalized body.  Patient states his legs feel so heavy to him that he is having now trouble walking even with a walker.  Denies any incontinence or saddle anesthesia.  Denies any headache/neck pain/chest pain/shortness of breath/fever/cough/rash.  States that he is having trouble mobilizing around the house because of the heavy sensation/weakness to the legs.  Was admitted to [facility] 4/1/2023 where he said he had a head CT which was negative but was not told what was wrong with his legs.  Last lumbar spinal MRI was done in December 2022 (patient has copy of this report---has lumbar spine dz with severe stenosis at L4-L5).  Chronic knee pain from the osteoarthritis no acute findings on recent x-ray of the knee.  ALL test orders reviewed. PA attempted to walk the pt with walker, pt very unsteady. To admit for further evaluation/rehab.

## 2023-04-17 NOTE — ED PROVIDER NOTE - PROGRESS NOTE DETAILS
venous duplex: neg for DVT ambulate patient with his walker, patient ambulate with shuffling and unsteady gait. concern for fall and will admit attempted to ambulate patient with his walker, patient ambulate with shuffling and unsteady gait. concern for fall and will admit

## 2023-04-17 NOTE — ED PROVIDER NOTE - OBJECTIVE STATEMENT
72 years old male history of hypertension, high cholesterol, CAD status post CABG, lumbar spine spinal stenosis (found in MRI in December 2022) present complaint right leg weakness/swelling worsening over the past few weeks.  Patient reports he was evaluated at outside hospital 1-1/2 weeks ago without significant finding and discharged home after 1 day of admission.  Patient reports he has been having more trouble to walk even with his walker at home so he called the EMS.  Denies recent fall or injury.  Otherwise denies fever, chills, chest pain, shortness of breath, abdominal pain, vomiting, diarrhea or urinary symptoms.

## 2023-04-18 VITALS — HEART RATE: 94 BPM | DIASTOLIC BLOOD PRESSURE: 85 MMHG | SYSTOLIC BLOOD PRESSURE: 163 MMHG

## 2023-04-18 DIAGNOSIS — R09.89 OTHER SPECIFIED SYMPTOMS AND SIGNS INVOLVING THE CIRCULATORY AND RESPIRATORY SYSTEMS: ICD-10-CM

## 2023-04-18 LAB
ALBUMIN SERPL ELPH-MCNC: 4.2 G/DL — SIGNIFICANT CHANGE UP (ref 3.5–5.2)
ALP SERPL-CCNC: 58 U/L — SIGNIFICANT CHANGE UP (ref 30–115)
ALT FLD-CCNC: 8 U/L — SIGNIFICANT CHANGE UP (ref 0–41)
ANION GAP SERPL CALC-SCNC: 12 MMOL/L — SIGNIFICANT CHANGE UP (ref 7–14)
AST SERPL-CCNC: 11 U/L — SIGNIFICANT CHANGE UP (ref 0–41)
BASOPHILS # BLD AUTO: 0.05 K/UL — SIGNIFICANT CHANGE UP (ref 0–0.2)
BASOPHILS NFR BLD AUTO: 0.8 % — SIGNIFICANT CHANGE UP (ref 0–1)
BILIRUB SERPL-MCNC: 1.2 MG/DL — SIGNIFICANT CHANGE UP (ref 0.2–1.2)
BUN SERPL-MCNC: 9 MG/DL — LOW (ref 10–20)
CALCIUM SERPL-MCNC: 10.1 MG/DL — SIGNIFICANT CHANGE UP (ref 8.4–10.5)
CHLORIDE SERPL-SCNC: 103 MMOL/L — SIGNIFICANT CHANGE UP (ref 98–110)
CO2 SERPL-SCNC: 26 MMOL/L — SIGNIFICANT CHANGE UP (ref 17–32)
CREAT SERPL-MCNC: 0.8 MG/DL — SIGNIFICANT CHANGE UP (ref 0.7–1.5)
EGFR: 94 ML/MIN/1.73M2 — SIGNIFICANT CHANGE UP
EOSINOPHIL # BLD AUTO: 0.14 K/UL — SIGNIFICANT CHANGE UP (ref 0–0.7)
EOSINOPHIL NFR BLD AUTO: 2.3 % — SIGNIFICANT CHANGE UP (ref 0–8)
FOLATE SERPL-MCNC: 7.4 NG/ML — SIGNIFICANT CHANGE UP
GLUCOSE SERPL-MCNC: 90 MG/DL — SIGNIFICANT CHANGE UP (ref 70–99)
HCT VFR BLD CALC: 41.5 % — LOW (ref 42–52)
HCV AB S/CO SERPL IA: 0.03 COI — SIGNIFICANT CHANGE UP
HCV AB SERPL-IMP: SIGNIFICANT CHANGE UP
HGB BLD-MCNC: 14.4 G/DL — SIGNIFICANT CHANGE UP (ref 14–18)
IMM GRANULOCYTES NFR BLD AUTO: 0.5 % — HIGH (ref 0.1–0.3)
LYMPHOCYTES # BLD AUTO: 1.9 K/UL — SIGNIFICANT CHANGE UP (ref 1.2–3.4)
LYMPHOCYTES # BLD AUTO: 30.9 % — SIGNIFICANT CHANGE UP (ref 20.5–51.1)
MCHC RBC-ENTMCNC: 28.9 PG — SIGNIFICANT CHANGE UP (ref 27–31)
MCHC RBC-ENTMCNC: 34.7 G/DL — SIGNIFICANT CHANGE UP (ref 32–37)
MCV RBC AUTO: 83.2 FL — SIGNIFICANT CHANGE UP (ref 80–94)
MONOCYTES # BLD AUTO: 0.55 K/UL — SIGNIFICANT CHANGE UP (ref 0.1–0.6)
MONOCYTES NFR BLD AUTO: 8.9 % — SIGNIFICANT CHANGE UP (ref 1.7–9.3)
NEUTROPHILS # BLD AUTO: 3.48 K/UL — SIGNIFICANT CHANGE UP (ref 1.4–6.5)
NEUTROPHILS NFR BLD AUTO: 56.6 % — SIGNIFICANT CHANGE UP (ref 42.2–75.2)
NRBC # BLD: 0 /100 WBCS — SIGNIFICANT CHANGE UP (ref 0–0)
PHOSPHATE SERPL-MCNC: 3.4 MG/DL — SIGNIFICANT CHANGE UP (ref 2.1–4.9)
PLATELET # BLD AUTO: 206 K/UL — SIGNIFICANT CHANGE UP (ref 130–400)
PMV BLD: 10.4 FL — SIGNIFICANT CHANGE UP (ref 7.4–10.4)
POTASSIUM SERPL-MCNC: 4.1 MMOL/L — SIGNIFICANT CHANGE UP (ref 3.5–5)
POTASSIUM SERPL-SCNC: 4.1 MMOL/L — SIGNIFICANT CHANGE UP (ref 3.5–5)
PROT SERPL-MCNC: 6.3 G/DL — SIGNIFICANT CHANGE UP (ref 6–8)
RBC # BLD: 4.99 M/UL — SIGNIFICANT CHANGE UP (ref 4.7–6.1)
RBC # FLD: 16.6 % — HIGH (ref 11.5–14.5)
SODIUM SERPL-SCNC: 141 MMOL/L — SIGNIFICANT CHANGE UP (ref 135–146)
TSH SERPL-MCNC: 0.51 UIU/ML — SIGNIFICANT CHANGE UP (ref 0.27–4.2)
VIT B12 SERPL-MCNC: 162 PG/ML — LOW (ref 232–1245)
WBC # BLD: 6.15 K/UL — SIGNIFICANT CHANGE UP (ref 4.8–10.8)
WBC # FLD AUTO: 6.15 K/UL — SIGNIFICANT CHANGE UP (ref 4.8–10.8)

## 2023-04-18 PROCEDURE — 99223 1ST HOSP IP/OBS HIGH 75: CPT

## 2023-04-18 RX ORDER — CLOPIDOGREL BISULFATE 75 MG/1
75 TABLET, FILM COATED ORAL DAILY
Refills: 0 | Status: DISCONTINUED | OUTPATIENT
Start: 2023-04-18 | End: 2023-04-18

## 2023-04-18 RX ORDER — ASPIRIN/CALCIUM CARB/MAGNESIUM 324 MG
1 TABLET ORAL
Refills: 0 | DISCHARGE

## 2023-04-18 RX ORDER — FUROSEMIDE 40 MG
40 TABLET ORAL ONCE
Refills: 0 | Status: COMPLETED | OUTPATIENT
Start: 2023-04-18 | End: 2023-04-18

## 2023-04-18 RX ORDER — ASPIRIN/CALCIUM CARB/MAGNESIUM 324 MG
81 TABLET ORAL DAILY
Refills: 0 | Status: DISCONTINUED | OUTPATIENT
Start: 2023-04-18 | End: 2023-04-18

## 2023-04-18 RX ORDER — LOSARTAN POTASSIUM 100 MG/1
100 TABLET, FILM COATED ORAL DAILY
Refills: 0 | Status: DISCONTINUED | OUTPATIENT
Start: 2023-04-18 | End: 2023-04-18

## 2023-04-18 RX ORDER — CARVEDILOL PHOSPHATE 80 MG/1
1 CAPSULE, EXTENDED RELEASE ORAL
Refills: 0 | DISCHARGE

## 2023-04-18 RX ORDER — GABAPENTIN 400 MG/1
1 CAPSULE ORAL
Refills: 0 | DISCHARGE

## 2023-04-18 RX ORDER — CLOPIDOGREL BISULFATE 75 MG/1
1 TABLET, FILM COATED ORAL
Refills: 0 | DISCHARGE

## 2023-04-18 RX ORDER — CARVEDILOL PHOSPHATE 80 MG/1
25 CAPSULE, EXTENDED RELEASE ORAL EVERY 12 HOURS
Refills: 0 | Status: DISCONTINUED | OUTPATIENT
Start: 2023-04-18 | End: 2023-04-18

## 2023-04-18 RX ORDER — FUROSEMIDE 40 MG
20 TABLET ORAL DAILY
Refills: 0 | Status: DISCONTINUED | OUTPATIENT
Start: 2023-04-18 | End: 2023-04-18

## 2023-04-18 RX ORDER — AMLODIPINE BESYLATE 2.5 MG/1
1 TABLET ORAL
Qty: 0 | Refills: 0 | DISCHARGE
Start: 2023-04-18

## 2023-04-18 RX ORDER — ENOXAPARIN SODIUM 100 MG/ML
40 INJECTION SUBCUTANEOUS EVERY 24 HOURS
Refills: 0 | Status: DISCONTINUED | OUTPATIENT
Start: 2023-04-18 | End: 2023-04-18

## 2023-04-18 RX ORDER — SIMVASTATIN 20 MG/1
1 TABLET, FILM COATED ORAL
Refills: 0 | DISCHARGE

## 2023-04-18 RX ORDER — ATORVASTATIN CALCIUM 80 MG/1
40 TABLET, FILM COATED ORAL AT BEDTIME
Refills: 0 | Status: DISCONTINUED | OUTPATIENT
Start: 2023-04-18 | End: 2023-04-18

## 2023-04-18 RX ORDER — FUROSEMIDE 40 MG
1 TABLET ORAL
Qty: 0 | Refills: 0 | DISCHARGE
Start: 2023-04-18

## 2023-04-18 RX ORDER — LOSARTAN POTASSIUM 100 MG/1
1 TABLET, FILM COATED ORAL
Refills: 0 | DISCHARGE

## 2023-04-18 RX ORDER — PANTOPRAZOLE SODIUM 20 MG/1
40 TABLET, DELAYED RELEASE ORAL
Refills: 0 | Status: DISCONTINUED | OUTPATIENT
Start: 2023-04-18 | End: 2023-04-18

## 2023-04-18 RX ORDER — AMLODIPINE BESYLATE 2.5 MG/1
5 TABLET ORAL DAILY
Refills: 0 | Status: DISCONTINUED | OUTPATIENT
Start: 2023-04-18 | End: 2023-04-18

## 2023-04-18 RX ORDER — HYDRALAZINE HCL 50 MG
10 TABLET ORAL ONCE
Refills: 0 | Status: COMPLETED | OUTPATIENT
Start: 2023-04-18 | End: 2023-04-18

## 2023-04-18 RX ADMIN — Medication 81 MILLIGRAM(S): at 13:03

## 2023-04-18 RX ADMIN — CARVEDILOL PHOSPHATE 25 MILLIGRAM(S): 80 CAPSULE, EXTENDED RELEASE ORAL at 06:03

## 2023-04-18 RX ADMIN — ENOXAPARIN SODIUM 40 MILLIGRAM(S): 100 INJECTION SUBCUTANEOUS at 06:02

## 2023-04-18 RX ADMIN — LOSARTAN POTASSIUM 100 MILLIGRAM(S): 100 TABLET, FILM COATED ORAL at 06:02

## 2023-04-18 RX ADMIN — Medication 40 MILLIGRAM(S): at 09:01

## 2023-04-18 RX ADMIN — CLOPIDOGREL BISULFATE 75 MILLIGRAM(S): 75 TABLET, FILM COATED ORAL at 13:03

## 2023-04-18 RX ADMIN — PANTOPRAZOLE SODIUM 40 MILLIGRAM(S): 20 TABLET, DELAYED RELEASE ORAL at 06:02

## 2023-04-18 RX ADMIN — Medication 10 MILLIGRAM(S): at 13:03

## 2023-04-18 NOTE — DISCHARGE NOTE PROVIDER - CARE PROVIDER_API CALL
KB DAVIS  Internal Medicine  [address] [place] [id number]  Phone: ([phone]  Fax: ()-  Follow Up Time: 1-3 days

## 2023-04-18 NOTE — H&P ADULT - NSHPPHYSICALEXAM_GEN_ALL_CORE
Gen: NAD  HEENT: PERRL, EOMI, mouth clr, nose clr  Neck: no nodes, no JVD, thyroid nl  lungs: clr  hrt: s1 s2 rrr no murmur  abd: soft, NT/ND, no HS megaly  ext: bl LE 3+ edema  neuro: aa ox3, cn intact, can move all 4 ext

## 2023-04-18 NOTE — DISCHARGE NOTE PROVIDER - HOSPITAL COURSE
71 yo M w PMH of sciatica, CABG 12 years ago, presents to the ED for generalized weakness.    No organic etiology was found on symptomatology, clinical findings or blood workup.  It might be due to gabapentin, that was discontinued in the hospital.    Patient's BP was high consistently in the hospital, and we advised the patient not to be discharged as he his at high risk of hypertensive emergency if he leaves without addressing his hemodynamics, and keeping him at least overnight to check them thoroughly.      Patient understood the risks associated with leaving, signed AMA forms, and was instructed to follow up with primary doctor and to present to the ED if any signs of hypertensive emergency appear.

## 2023-04-18 NOTE — DISCHARGE NOTE NURSING/CASE MANAGEMENT/SOCIAL WORK - PATIENT PORTAL LINK FT
You can access the FollowMyHealth Patient Portal offered by [facility] by registering at the following website: [email or web address]. By joining [employer]’s FollowMyHealth portal, you will also be able to view your health information using other applications (apps) compatible with our system.

## 2023-04-18 NOTE — H&P ADULT - HISTORY OF PRESENT ILLNESS
73 yo M w PMH w PMH of sciatica, CABG 13 yo presents to the ED w bl LE swelling for the past 3 weeks and bl LE weakness for the past 2 weeks He was admitted to [facility] on 3/31 for the above complaints and the workup there (MR brain) was negative, and he was discharged home after one day of hospital stay.   2 weeks ago, he was able to ambulate with a cane, but, today he was unable to ambulate that prompted him to call the ambulance and hence was brought to the ED.   On exam pt's upper extremities also appear to be swollen and pt agrees as well.    In the ED, VS /83  labs wnl  CXR wnl (fu official read)  bl LE duplex: prelim Negative for dvt as per pt (official report pending)  
normal...

## 2023-04-18 NOTE — DISCHARGE NOTE PROVIDER - NSDCMRMEDTOKEN_GEN_ALL_CORE_FT
amLODIPine 5 mg oral tablet: 1 tab(s) orally once a day  aspirin 81 mg oral capsule: 1 cap(s) orally once a day  carvedilol 25 mg oral tablet: 1 tab(s) orally 2 times a day  cloNIDine 0.1 mg oral tablet: 1 tab(s) orally 2 times a day  Cozaar 100 mg oral tablet: 1 tab(s) orally once a day  furosemide 20 mg oral tablet: 1 tab(s) orally once a day  Plavix 75 mg oral tablet: 1 tab(s) orally once a day  simvastatin 20 mg oral tablet: 1 tab(s) orally once a day

## 2023-04-18 NOTE — MEDICAL STUDENT PROGRESS NOTE(EDUCATION) - NS MD HP STUD ASPLAN PLAN FT
#bl LE swelling (cause unknown)    #bl UE swelling?  top NG    bnp 238  - fu duplex; Negative  - fu TSH   - lasix 40 po once  - gabapentin held    #bl LE weakness (prox > distal)  recent MR brain wnl (contact [facility] in the am for reports)  3+ power on exam, no focal weakness  - fu tsh   folate    b12  - PT eval; 125' with walker, recommended home PT and aid    #CAD sp CABG   - cw carvedilol 25q12  - cw aspirin + plavix    HTN: cw losartan 100qd  HLD: cw atorvastatin 80qd    medrec confirmed w pt, feels he is missing some meds  tried contacting Ida cantrell at [address], w no response, closed until 10AM      dvt ppx: lovenox  gi ppx: protonix  diet: dash  acitivity: aat   #bl LE swelling (cause unknown)    top NG    bnp 238  - fu duplex; Negative  - lasix 40 po once  - gabapentin held    #bl LE weakness (prox > distal)  recent MR brain wnl (contact [facility] in the am for reports)  3+ power on exam, no focal weakness  - PT eval; 125' with walker, recommended home PT and aid    #CAD sp CABG   - cw carvedilol 25q12  - cw aspirin + plavix    HTN: cw losartan 100qd  HLD: cw atorvastatin 80qd    medrec confirmed w pt, feels he is missing some meds  tried contacting Ida cantrell at [address], w no response, closed until 10AM      dvt ppx: lovenox  gi ppx: protonix  diet: dash  Activity : aat   #bl LE swelling (cause unknown)    top NG    bnp 238  - fu duplex; Negative  - Lasix 20 mg qday  - Gabapentin held    #bl LE weakness (prox > distal)  recent MR brain wnl (contact [facility] in the am for reports)  3+ power on exam, no focal weakness  - PT eval; 125' with walker, recommended home PT and aid    #CAD sp CABG   - cw carvedilol 25q12  - cw aspirin + plavix    HTN: cw losartan 100qd  HLD: cw atorvastatin 80qd    medrec confirmed w pt, feels he is missing some meds  Reconciled medications with [facility]      dvt ppx: lovenox  gi ppx: protonix  diet: dash  Activity : aat

## 2023-04-18 NOTE — H&P ADULT - ATTENDING COMMENTS
patient seen and examined independently of admitting medical resident and case discussed with daytime senior resident in rounds   -agree with note unless otherwise stated     Vital Signs Last 24 Hrs  T(C): 36.9 (18 Apr 2023 12:50), Max: 36.9 (18 Apr 2023 12:50)  T(F): 98.4 (18 Apr 2023 12:50), Max: 98.4 (18 Apr 2023 12:50)  HR: 94 (18 Apr 2023 14:30) (69 - 98)  BP: 163/85 (18 Apr 2023 14:30) (137/72 - 208/102)  BP(mean): --  RR: 18 (18 Apr 2023 12:50) (18 - 20)  SpO2: 98% (18 Apr 2023 07:50) (97% - 98%)    Parameters below as of 18 Apr 2023 07:50  Patient On (Oxygen Delivery Method): room air                          14.4   6.15  )-----------( 206      ( 18 Apr 2023 04:30 )             41.5   04-18    141  |  103  |  9<L>  ----------------------------<  90  4.1   |  26  |  0.8    Ca    10.1      18 Apr 2023 04:30  Phos  3.4     04-18    TPro  6.3  /  Alb  4.2  /  TBili  1.2  /  DBili  x   /  AST  11  /  ALT  8   /  AlkPhos  58  04-18    # Generalized weakness   # Hx of Sciatica   # LE swelling - NO CHF   # Hypertensive urgency / uncontrolled HTN (/102 at 12 pm 4/18/23)  # CAD/CABG/Hyperlipidemia       -Lasix dose given - check 2d echo, , cxr with [facility], LE duplex -ve for DVT - no need for diuresis - clinically not in volume overload - likely dependent edema   -add hydralazine for uncontrolled BP (with parameters) + continuation of home bp meds  -home maintenance meds   -rehab/pt as tolerated - uses walker for ambulation (at bedside)     DISPO: inpatient admission/monitoring for medical stabilization   -spoke to patient about his plan of care   -he lives in an apartment with his GF - [place]   -discussed with CM in rounds   -anticipate for d/c next 24 hrs     Attending Physician Dr. Misa Tabares # [id number]

## 2023-04-18 NOTE — H&P ADULT - ASSESSMENT
73 yo M w PMH of sciatica, CABG 12 y ago presents w bl LE swelling and weakness.    #bl LE swelling (cause unknown)    #bl UE swelling?  top NG    bnp 238  - fu duplex  - fu TSH   - lasix 40 po once    #bl LE weakness   recent MR brain wnl (contact [facility] in the am for reports)  2+ power on exam, no focal weakness  doubt deconditioning  - fu tsh   folate    b12  - PT eval      tried contacting Ida cantrell at [address], w no response  please contact in the am      dvt ppx: lovenox  gi ppx: protonix  diet: dash  acitivity: aat     73 yo M w PMH of sciatica, CABG 12 y ago presents w bl LE swelling and weakness.    #bl LE swelling (cause unknown)    #bl UE swelling?  top NG    bnp 238  - fu duplex  - fu TSH   - lasix 40 po once  - gabapentin held    #bl LE weakness (prox > distal)  recent MR brain wnl (contact [facility] in the am for reports)  3+ power on exam, no focal weakness  doubt deconditioning  - fu tsh   folate    b12  - PT eval    #CAD sp CABG   - cw carvedilol 25q12  - cw aspirin + plavix    HTN: cw losartan 100qd  HLD: cw atorvastatin 80qd    medrec confirmed w pt, feels he is missing some meds  tried contacting Ida cantrell at [address], w no response  please contact in the am    dvt ppx: lovenox  gi ppx: protonix  diet: dash  acitivity: aat

## 2023-04-18 NOTE — MEDICAL STUDENT PROGRESS NOTE(EDUCATION) - SUBJECTIVE AND OBJECTIVE BOX
LENGTH OF HOSPITAL STAY: 1d    CHIEF COMPLAINT:    Patient is a 72y old  Male who presents with a chief complaint of bl LE swelling (18 Apr 2023 02:30)      FOLLOW UP:    HOSPITAL COURSE: Pt came to the hospital with complaints of difficulty walking, and swelling in bilateral lower extremities. Pt states that he noticed the swelling over the last two weeks, and that he usually ambulates with a cane but is not able to do that currently. Pt states that he feels weak and it is affecting his balance. He states that when he was lying flat he felt a pressure on his chest, but when he had the head of the bed raised he felt that he could breathe easier. Pt was given Lasix 40 PO and swelling went down. Physical therapy worked with the patient and he was able to walk ~125' with a walker. PT recommended home therapy and cleared him for discharge.    HPI:    73 yo M w PMH w PMH of sciatica, CABG 11 yo presents to the ED w bl LE swelling for the past 3 weeks and bl LE weakness for the past 2 weeks He was admitted to [facility] on 3/31 for the above complaints and the workup there (MR brain) was negative, and he was discharged home after one day of hospital stay.   2 weeks ago, he was able to ambulate with a cane, but, today he was unable to ambulate that prompted him to call the ambulance and hence was brought to the ED.   On exam pt's upper extremities also appear to be swollen and pt agrees as well.          ALLERGIES:    Allergies    No Known Allergies    Intolerances        PMHx:    PAST MEDICAL & SURGICAL HISTORY:  HTN (hypertension)      HLD (hyperlipidemia)      CAD (coronary artery disease)      S/P CABG (coronary artery bypass graft)          SOCIAL Hx:  Pt admits to past history of smoking, but states he stopped "a long time ago"    MEDICATIONS:  STANDING MEDICATIONS  aspirin  chewable 81 milliGRAM(s) Oral daily  atorvastatin 40 milliGRAM(s) Oral at bedtime  carvedilol 25 milliGRAM(s) Oral every 12 hours  clopidogrel Tablet 75 milliGRAM(s) Oral daily  enoxaparin Injectable 40 milliGRAM(s) SubCutaneous every 24 hours  losartan 100 milliGRAM(s) Oral daily  pantoprazole    Tablet 40 milliGRAM(s) Oral before breakfast    PRN MEDICATIONS      LABS:                        14.4   6.15  )-----------( 206      ( 18 Apr 2023 04:30 )             41.5     04-17    143  |  106  |  10  ----------------------------<  119<H>  4.1   |  28  |  0.8    Ca    9.8      17 Apr 2023 20:04    TPro  6.4  /  Alb  4.1  /  TBili  0.9  /  DBili  x   /  AST  10  /  ALT  9   /  AlkPhos  55  04-17          Troponin T, Serum: <0.01 ng/mL (04-17-23 @ 20:33)      CARDIAC MARKERS ( 17 Apr 2023 20:33 )  x     / <0.01 ng/mL / x     / x     / x              VITALS:   T(F): 97.9  HR: 71  BP: 151/74  RR: 19  SpO2: 98%        PHYSICAL EXAM:    Gen: NAD, resting in bed  HEENT: Normocephalic, atraumatic  Neck: supple, no lymphadenopathy  CV: Regular rate & regular rhythm  Lungs: Normal breath sounds, no fremitus  Abdomen: Soft, BS present  Ext: Warm, well perfused, +0 edema, neuro intact  Neuro: AOx3, awake  Skin: no rash, no erythema

## 2023-04-18 NOTE — DISCHARGE NOTE NURSING/CASE MANAGEMENT/SOCIAL WORK - NSDCPEFALRISK_GEN_ALL_CORE
For information on Fall & Injury Prevention, visit: [email or web address] OR  [email or web address] OR  https://www.cdc.gov/steadi/patient.html

## 2023-04-18 NOTE — PHYSICAL THERAPY INITIAL EVALUATION ADULT - LIVES WITH, PROFILE
per pt he lives with his girl friend in apartment. + elevator, + rollator, + RW, + SC. pt has HHA 4hrs/day

## 2023-04-18 NOTE — PHYSICAL THERAPY INITIAL EVALUATION ADULT - PERTINENT HX OF CURRENT PROBLEM, REHAB EVAL
71 yo M w PMH w PMH of sciatica, CABG 13 yo presents to the ED w bl LE swelling for the past 3 weeks and bl LE weakness for the past 2 weeks He was admitted to [facility] on 3/31 for the above complaints and the workup there (MR brain) was negative, and he was discharged home after one day of hospital stay.

## 2023-04-18 NOTE — DISCHARGE NOTE PROVIDER - NSDCCPCAREPLAN_GEN_ALL_CORE_FT
PRINCIPAL DISCHARGE DIAGNOSIS  Diagnosis: Ambulatory dysfunction  Assessment and Plan of Treatment: You were admitted for generalized weakness.  You did not have any symptoms or clinical findings that would let us orient a definitive diagnosis, and your lab workup was fine.  It might be due to gabapentin that we are going to discontinue.  But your blood pressure is high, and you need to monitor your blood pressure at home and check with your primary care doctor for any adjustment.

## 2023-04-25 DIAGNOSIS — Z79.02 LONG TERM (CURRENT) USE OF ANTITHROMBOTICS/ANTIPLATELETS: ICD-10-CM

## 2023-04-25 DIAGNOSIS — I25.10 ATHEROSCLEROTIC HEART DISEASE OF NATIVE CORONARY ARTERY WITHOUT ANGINA PECTORIS: ICD-10-CM

## 2023-04-25 DIAGNOSIS — R29.898 OTHER SYMPTOMS AND SIGNS INVOLVING THE MUSCULOSKELETAL SYSTEM: ICD-10-CM

## 2023-04-25 DIAGNOSIS — T42.6X5A ADVERSE EFFECT OF OTHER ANTIEPILEPTIC AND SEDATIVE-HYPNOTIC DRUGS, INITIAL ENCOUNTER: ICD-10-CM

## 2023-04-25 DIAGNOSIS — Z20.822 CONTACT WITH AND (SUSPECTED) EXPOSURE TO COVID-19: ICD-10-CM

## 2023-04-25 DIAGNOSIS — I16.0 HYPERTENSIVE URGENCY: ICD-10-CM
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
## 2023-04-25 DIAGNOSIS — E11.9 TYPE 2 DIABETES MELLITUS WITHOUT COMPLICATIONS: ICD-10-CM

## 2023-04-25 DIAGNOSIS — Z53.29 PROCEDURE AND TREATMENT NOT CARRIED OUT BECAUSE OF PATIENT'S DECISION FOR OTHER REASONS: ICD-10-CM

## 2023-04-25 DIAGNOSIS — Z79.82 LONG TERM (CURRENT) USE OF ASPIRIN: ICD-10-CM

## 2023-04-25 DIAGNOSIS — M79.89 OTHER SPECIFIED SOFT TISSUE DISORDERS: ICD-10-CM

## 2023-04-25 DIAGNOSIS — E78.5 HYPERLIPIDEMIA, UNSPECIFIED: ICD-10-CM

## 2023-04-25 DIAGNOSIS — M48.061 SPINAL STENOSIS, LUMBAR REGION WITHOUT NEUROGENIC CLAUDICATION: ICD-10-CM

## 2023-04-25 DIAGNOSIS — K21.9 GASTRO-ESOPHAGEAL REFLUX DISEASE WITHOUT ESOPHAGITIS: ICD-10-CM

## 2023-04-25 DIAGNOSIS — I10 ESSENTIAL (PRIMARY) HYPERTENSION: ICD-10-CM

## 2023-04-25 DIAGNOSIS — M19.91 PRIMARY OSTEOARTHRITIS, UNSPECIFIED SITE: ICD-10-CM

## 2023-04-25 DIAGNOSIS — Z95.1 PRESENCE OF AORTOCORONARY BYPASS GRAFT: ICD-10-CM

## 2023-09-01 ENCOUNTER — APPOINTMENT (OUTPATIENT)
Dept: NEUROLOGY | Facility: CLINIC | Age: 73
End: 2023-09-01

## 2023-09-15 ENCOUNTER — APPOINTMENT (OUTPATIENT)
Dept: NEUROLOGY | Facility: CLINIC | Age: 73
End: 2023-09-15

## 2023-09-28 ENCOUNTER — APPOINTMENT (OUTPATIENT)
Dept: PAIN MANAGEMENT | Facility: CLINIC | Age: 73
End: 2023-09-28

## 2023-10-27 ENCOUNTER — APPOINTMENT (OUTPATIENT)
Dept: NEUROLOGY | Facility: CLINIC | Age: 73
End: 2023-10-27

## 2024-01-30 ENCOUNTER — APPOINTMENT (OUTPATIENT)
Dept: NEUROLOGY | Facility: CLINIC | Age: 74
End: 2024-01-30
Payer: MEDICARE

## 2024-01-30 VITALS — BODY MASS INDEX: 29.82 KG/M2 | WEIGHT: 190 LBS | HEIGHT: 67 IN

## 2024-01-30 VITALS — DIASTOLIC BLOOD PRESSURE: 118 MMHG | SYSTOLIC BLOOD PRESSURE: 189 MMHG | HEART RATE: 69 BPM

## 2024-01-30 DIAGNOSIS — M54.16 RADICULOPATHY, LUMBAR REGION: ICD-10-CM

## 2024-01-30 PROCEDURE — 99204 OFFICE O/P NEW MOD 45 MIN: CPT

## 2024-01-30 PROCEDURE — G2211 COMPLEX E/M VISIT ADD ON: CPT

## 2024-01-30 NOTE — ASSESSMENT
[FreeTextEntry1] : Chronic cervical/lumbar radiculopathy -MRI of the cervical spine without gadolinium - MRI of the thoracic spine without gadolinium - EMG nerve conduction study of both upper and lower extremities  Head drop-etiology unclear, but as per report, chronic -May be secondary to his cervical spine pathology  Total clinician time spent  is  46 minutes including preparing to see the patient, obtaining and/or reviewing and confirming history, performing a medically necessary and appropriate examination, counseling and educating the patient and/or family, documenting clinical information in the HER and communicating and/or referring to other healthcare professionals.   I, Janee Ovalle, Attest that this documentation has been prepared under the direction and in the presence of Provider Jozef George DO  Thank You for letting me assist in the management of this patient.   Jozef George DO Board Certified, Neurology

## 2024-01-30 NOTE — PHYSICAL EXAM

## 2024-01-30 NOTE — HISTORY OF PRESENT ILLNESS
[FreeTextEntry1] : It's a pleasure to see Mr. MICHELE SHAH In the office today. He is a 73 year -  old man  who presents to the office today for the evaluation of chronic cervical/lumbar radiculopathy for which she has had for years but recently his pain worsened.  He has done physical therapy in the past without much improvement, he also had seen our pain management doctor for 1 injection that gave him relief for only a few hours.  He never returned for follow-up.  His past MRI showed multilevel degenerative disc disease other than the chronic neck pain and back pain he reports that he has trouble lifting his head as well as bilateral leg weakness which has been going on for few years.  He recently had x-ray of the cervical spine at [facility] and the report noted that there was not any instability with flexion extension, however patient reported that he never did it with flexion or extension

## 2024-02-09 ENCOUNTER — APPOINTMENT (OUTPATIENT)
Dept: NEUROLOGY | Facility: CLINIC | Age: 74
End: 2024-02-09
Payer: MEDICARE

## 2024-02-09 PROCEDURE — 95913 NRV CNDJ TEST 13/> STUDIES: CPT

## 2024-02-09 PROCEDURE — 95886 MUSC TEST DONE W/N TEST COMP: CPT

## 2024-02-22 ENCOUNTER — APPOINTMENT (OUTPATIENT)
Dept: MRI IMAGING | Facility: CLINIC | Age: 74
End: 2024-02-22

## 2024-02-23 ENCOUNTER — OUTPATIENT (OUTPATIENT)
Dept: OUTPATIENT SERVICES | Facility: HOSPITAL | Age: 74
LOS: 1 days | End: 2024-02-23
Payer: MEDICARE

## 2024-02-23 ENCOUNTER — APPOINTMENT (OUTPATIENT)
Dept: OPHTHALMOLOGY | Facility: CLINIC | Age: 74
End: 2024-02-23
Payer: MEDICARE

## 2024-02-23 DIAGNOSIS — H02.844 EDEMA OF LEFT UPPER EYELID: ICD-10-CM

## 2024-02-23 DIAGNOSIS — H02.88A MEIBOMIAN GLAND DYSFUNCTION RIGHT EYE, UPPER AND LOWER EYELIDS: ICD-10-CM

## 2024-02-23 DIAGNOSIS — H53.8 OTHER VISUAL DISTURBANCES: ICD-10-CM

## 2024-02-23 DIAGNOSIS — H16.229 KERATOCONJUNCTIVITIS SICCA, NOT SPECIFIED AS SJOGREN'S, UNSPECIFIED EYE: ICD-10-CM

## 2024-02-23 PROCEDURE — 92002 INTRM OPH EXAM NEW PATIENT: CPT

## 2024-03-06 ENCOUNTER — APPOINTMENT (OUTPATIENT)
Dept: NEUROLOGY | Facility: CLINIC | Age: 74
End: 2024-03-06

## 2024-04-22 ENCOUNTER — APPOINTMENT (OUTPATIENT)
Dept: NEUROLOGY | Facility: CLINIC | Age: 74
End: 2024-04-22
Payer: MEDICARE

## 2024-04-22 VITALS
DIASTOLIC BLOOD PRESSURE: 99 MMHG | HEIGHT: 67 IN | WEIGHT: 188 LBS | SYSTOLIC BLOOD PRESSURE: 195 MMHG | HEART RATE: 66 BPM | BODY MASS INDEX: 29.51 KG/M2

## 2024-04-22 DIAGNOSIS — E11.42 TYPE 2 DIABETES MELLITUS WITH DIABETIC POLYNEUROPATHY: ICD-10-CM

## 2024-04-22 DIAGNOSIS — M48.07 SPINAL STENOSIS, LUMBOSACRAL REGION: ICD-10-CM

## 2024-04-22 DIAGNOSIS — M54.12 RADICULOPATHY, CERVICAL REGION: ICD-10-CM

## 2024-04-22 DIAGNOSIS — M48.062 SPINAL STENOSIS, LUMBAR REGION WITH NEUROGENIC CLAUDICATION: ICD-10-CM

## 2024-04-22 PROCEDURE — 99214 OFFICE O/P EST MOD 30 MIN: CPT

## 2024-04-22 RX ORDER — BACLOFEN 5 MG/1
5 TABLET ORAL DAILY
Qty: 30 | Refills: 0 | Status: ACTIVE | COMMUNITY
Start: 2024-04-22 | End: 1900-01-01

## 2024-04-22 NOTE — PHYSICAL EXAM
[Person] : oriented to person [Place] : oriented to place [Time] : oriented to time [Concentration Intact] : normal concentrating ability [Visual Intact] : visual attention was ~T not ~L decreased [Naming Objects] : no difficulty naming common objects [Repeating Phrases] : no difficulty repeating a phrase [Writing A Sentence] : no difficulty writing a sentence [Fluency] : fluency intact [Comprehension] : comprehension intact [Reading] : reading intact [Past History] : adequate knowledge of personal past history [Cranial Nerves Optic (II)] : visual acuity intact bilaterally,  visual fields full to confrontation, pupils equal round and reactive to light [Cranial Nerves Oculomotor (III)] : extraocular motion intact [Cranial Nerves Trigeminal (V)] : facial sensation intact symmetrically [Cranial Nerves Facial (VII)] : face symmetrical [Cranial Nerves Vestibulocochlear (VIII)] : hearing was intact bilaterally [Cranial Nerves Glossopharyngeal (IX)] : tongue and palate midline [Cranial Nerves Accessory (XI - Cranial And Spinal)] : head turning and shoulder shrug symmetric [Cranial Nerves Hypoglossal (XII)] : there was no tongue deviation with protrusion [No Muscle Atrophy] : normal bulk in all four extremities [Sensation Tactile Decrease] : light touch was intact [0] : Ankle jerk left 0 [Motor Strength Upper Extremities Bilaterally] : there was weakness in both upper extremities [Motor Strength Lower Extremities Bilaterally] : there was weakness in both lower extremities [Past-pointing] : there was no past-pointing [Tremor] : no tremor present [Plantar Reflex Right Only] : normal on the right [Plantar Reflex Left Only] : normal on the left [FreeTextEntry6] : Trouble lifting neck voluntarily, very limited range of motion in the neck horizontally [FreeTextEntry8] : Walks with walker [FreeTextEntry1] : Wheelchair bound  [Normal Coordination] : normal coordination [Normal DTR UE/LE] : normal DTR UE/LE  [Normal Sensation] : normal sensation [Normal Mood and Affect] : normal mood and affect [Orientated] : orientated [Able to Communicate] : able to communicate [Well Developed] : well developed [Well Nourished] : well nourished [4___] : right plantor flexors 4[unfilled]/5 [Diminished] : patella reflex diminished [] : uses walker

## 2024-04-22 NOTE — ASSESSMENT
[FreeTextEntry1] : 73 year old male with chronic / severe cervical/lumbar radiculopathy with neurogenic claudication as well as diabetic polyneuropathy. Today we reviewed his MRI of the cervical spine and EMG's of the upper and lower extremities. Patient was provided referral to be evaluated by neurosurgery for his neck and will follow up with pain management. Patient declines resuming Gabapentin and will begin a trial of Baclofen 5mg PRN for his lower extremity spasticity. He will f/u with his PCP regarding DM control and will return to the office for follow up in 4-6 months barring issues.  Patient is aware that they may call/ contact the office at any time if they have any additional questions or concerns regarding their management. All potential risks, benefits, side effects and interactions of any medications prescribed were discussed in detail with patient at the time of todays encounter.  Supervising Physician : Jozef George DO

## 2024-04-22 NOTE — HISTORY OF PRESENT ILLNESS
[FreeTextEntry1] : Original Presentation :    It's a pleasure to see Mr. MICHELE TABARES In the office today. He is a 73 year - old man who presents to the office today for the evaluation of chronic cervical/lumbar radiculopathy for which she has had for years but recently his pain worsened. He has done physical therapy in the past without much improvement, he also had seen our pain management doctor for 1 injection that gave him relief for only a few hours. He never returned for follow-up. His past MRI showed multilevel degenerative disc disease other than the chronic neck pain and back pain he reports that he has trouble lifting his head as well as bilateral leg weakness which has been going on for few years. He recently had x-ray of the cervical spine at [facility] and the report noted that there was not any instability with flexion extension, however patient reported that he never did it with flexion or extension   Diagnostic Testing :   EMG Upper Extremities 2.9.24 : Diffuse sensori-motor polyneuropathy as well as multiple cervical radiculopathies  EMG Lower Extremities 2.9.24 : diffuse sensori-motor polyneuropathy   MRI Lumbar Spine : No fracture or bony destructive changes, Grade 1 anterolisthesis L4/5. Severe central canal and lateral recess stenosis L4/5. Moderate canal stenosis L2-3 L3-4. Multilevel neural foraminal stenoses worst at right L2-3 right L4-5 and right L5-S1 levels.  XRay Cervical spine 12.4.23 : No evidence of instability with flexion extension, Degenerative changes noted.   Today : Today I had the pleasure of seeing Mr. Tabares  in follow up. Patient prior imaging and history have been reviewed.    Patient remains under our care for chronic severe cervical disc disease, chronic lumbar radiculopathy and diabetic polyneuropathy.  Today we reviewed the results of his MRI of the Cervical Spine and  EMG testing of the upper and lower extremities. Patient has long hx of neck pain which has progressed over many years resulting in fixed flexion of the neck, weakness of the upper and lower extremities and lastly, spasticity to his lower extremities (R > L). Patient states he no longer experiences pain in his neck however is unable to lift his head from a flexed position (chin against chest)  or turn to either side.  He also notes progressive weakness to his lower extremities causing him to be wheelchair dependent and has episodes of involuntary spasticity to his RLE. MRI of the Cervical spine reviewed today. Patient states he has seen many doctors about his neck and was told there is "nothing to do" . Today we discussed starting a trial of Baclofen PRN for severe muscle spasms and I will provide referral for him to see neurosurgery.   Patient is also diabetic unsure of last A1c. EMG of the upper and lower extremities noted B/l diffuse sensory motor polyneuropathy contributing to his ambulatory difficulties. Today we discussed modes of conservative management including physical therapy, occupational therapy, activity modifications and proper diabetes management which he will follow up with his PCP to discuss. Patient was unable to tolerate Gabapentin in the past denies pain to his extremities noting primarily numbness and spasticity.

## 2024-10-22 ENCOUNTER — APPOINTMENT (OUTPATIENT)
Dept: NEUROLOGY | Facility: CLINIC | Age: 74
End: 2024-10-22

## 2025-05-28 NOTE — PHYSICAL THERAPY INITIAL EVALUATION ADULT - NSPTDISCHREC_GEN_A_CORE
spoke with the patient's spouse about his appointment on  as they are not sure if they will be able to make it or not.      GOALS:  Patient Goal: Improve his speech    Short Term Goals:  Time Frame for Short-Term Goals: 4 weeks    SHORT TERM GOAL #1: Patient will complete verbal expression tasks (naming, repetition, sentence completion, verbal description, verbal fluency) with 70% accuracy with min cues to utilize word finding strategies to improve functional expressive communication.  INTERVENTIONS:   Completed further review of the word finding strategies and provided examples of how to utilize.      Sentence completion:  7/10 independent, 2/10 min cues, 1/10 with max cues    Confrontational naming (familiar objects):  independent,  min cues,  max cues  **great success with use of verbal description    Verbal description:   Given a picture, the patient was instructed to verbalize what is happening in the picture:  without cues,  with min cues,  with mod cues    SHORT TERM GOAL #2: Patient will complete auditory/reading comprehension (complex yes/no questions, complex verbal and written directions, reading comprehension) with 80% accuracy with min cues to improve understanding of complex conversation.  INTERVENTIONS: did not test this date d/t focus on other goals.    Previous session:   Following 2 step directions with object manipulation: /5 independent, 3/5 with min cues, 1/5 with mod cues    SHORT TERM GOAL #3: Patient will complete written expression tasks (biographics, functional words/phrases, lists, etc) with 70% accuracy with min cues for improved success writing for work related tasks.  INTERVENTIONS:   Patient wrote the following:  *first name: independent  *last name: independent  *: min cues  *How are you: independent  *Thank you: mod cues  *Help me, please: min cues  *I am fine: independent    SHORT TERM GOAL #4: Patient will complete functional carryover tasks (o-log,  Home with PT and assist